# Patient Record
Sex: FEMALE | Race: WHITE | Employment: FULL TIME | URBAN - NONMETROPOLITAN AREA
[De-identification: names, ages, dates, MRNs, and addresses within clinical notes are randomized per-mention and may not be internally consistent; named-entity substitution may affect disease eponyms.]

---

## 2021-06-23 ENCOUNTER — HOSPITAL ENCOUNTER (OUTPATIENT)
Age: 51
Setting detail: OUTPATIENT SURGERY
Discharge: HOME | End: 2021-06-23
Attending: INTERNAL MEDICINE | Admitting: INTERNAL MEDICINE

## 2021-06-23 PROCEDURE — 3609015500 HC GASTRIC/DUODENAL MOTILITY &/OR MANOMETRY STUDY: Performed by: INTERNAL MEDICINE

## 2021-06-23 RX ORDER — ATORVASTATIN CALCIUM 40 MG/1
40 TABLET, FILM COATED ORAL
COMMUNITY
Start: 2020-12-23

## 2021-06-23 RX ORDER — FERROUS SULFATE 325(65) MG
325 TABLET ORAL 2 TIMES DAILY
COMMUNITY
Start: 2021-06-08 | End: 2021-08-07

## 2021-06-23 RX ORDER — MONTELUKAST SODIUM 10 MG/1
10 TABLET ORAL DAILY
COMMUNITY
Start: 2021-05-28

## 2021-06-23 RX ORDER — FLUOXETINE HYDROCHLORIDE 40 MG/1
40 CAPSULE ORAL DAILY
COMMUNITY
Start: 2020-11-12

## 2021-06-23 RX ORDER — HYDROCHLOROTHIAZIDE 25 MG/1
25 TABLET ORAL DAILY
COMMUNITY
Start: 2020-12-23

## 2021-06-23 RX ORDER — LIDOCAINE HYDROCHLORIDE 20 MG/ML
JELLY TOPICAL
Status: DISCONTINUED
Start: 2021-06-23 | End: 2021-06-23 | Stop reason: HOSPADM

## 2021-06-23 RX ORDER — CHOLECALCIFEROL (VITAMIN D3) 25 MCG
CAPSULE ORAL
COMMUNITY
Start: 2021-05-04

## 2021-06-23 RX ORDER — ATORVASTATIN CALCIUM 40 MG/1
TABLET, FILM COATED ORAL
COMMUNITY
Start: 2021-03-26

## 2021-06-23 RX ORDER — LOSARTAN POTASSIUM 100 MG/1
100 TABLET ORAL DAILY
COMMUNITY
Start: 2020-12-23

## 2021-06-23 NOTE — PROGRESS NOTES
Patient in endo for Eft study. Consent form signed. Manometry probe passed into left nare at 46 cm into stomach  Liquid swallows performed  Ph probe inserted into left nare  Lot number [id number]  Discharge instructions given to pt and she verbalized understanding.  Patient tolerated well and d/c home

## 2021-07-09 NOTE — PROCEDURES
[address] [place]  [address]                High Resolution Esophageal Manometry Study      Patient:  Marlen Wu    [id number] Gender: Female Physician: DR. George Ferreira / Age: 1970 : [employer]    Height: 5 ft 3 in Referring Physician: Catherine Gallegos    Procedure: Esophageal Manometry with Impedance Examination Date: 06/23/2021     Lower Esophageal Sphincter Region  Normal Esophageal Motility  Normal   Landmarks   Number of swallows evaluated 10        LES midpoint (from nares)(cm) 41.0  High Resolution Parameters         Proximal LES (from nares)(cm) 40.0      Distal contractile integral(mean)(mmHg-cm-s) 2614.6 500-5000       Distal LES (from nares)(cm) 44.0      Distal contractile integral(highest)(mmHg-cm-s) 3338.7        LES length(cm) 4.0 2.7-4.8     Contractile front velocity(cm/s) 2.3 <9.0       Esophageal length (LES-UES centers)(cm) 22.0  Carlton Classification         PIP (from nares)(cm) 41.5      Distal latency 8.5        Intraabdominal LES length(cm) 2.5      % failed (Carlton Classification) 10        Hiatal hernia? No      % panesophageal pressurization 10    LES Pressures       % premature contraction 0        Pressure vicki. method eSleeve,IRP      % rapid contraction 0        Basal (respiratory min. )(mmHg) 40.5 4.8-32.0     % large breaks 0        Basal (respiratory mean)(mmHg) 55.9 13-43     % small breaks 0        Residual (mean)(mmHg) 26.6 <15.0 Impedance analysis            Incomplete bolus clearance(%) 10            Upper Esophageal Sphincter  Normal Pharyngeal / UES Motility  Normal   Location (center, fr. nares)(cm) 19.0  No. swallows evaluated 10    Mean basal pressure(mmHg) 75.0  Evaluated @ 3.0 & N/A above UES     Mean residual pressure(mmHg) 5.5 <12.0     Mean peak pressure(mmHg) 12.5           Carlton Classification Findings*   EGJ: Outflow obstruction          Mean IRP (26.6 mmHg) is greater than 15 mmHg  Esophageal body: Some instances of intact or weak peristalsis  Finding: EGJ Outflow Obstruction  * Findings are based on published [place] Classification scheme and are only intended to serve as a guide for patient diagnosis     Procedure   After confirmation of potential allergies, a topical analgesic was used to numb the nares followed by trans-nasal insertion of a High Resolution Manometry Catheter. Pressure bands of both UES and LES were observed on the color contour. Patient instructed to take deep breath to verify placement of catheter; diaphragmatic pinch noted on inspiration. Patient was assisted to supine position and catheter was stabilized. Patient encouraged to relax while acclimating to catheter for approximately 5 minutes. A 30 second baseline pressure was obtained to identify the UES and LES followed by a series of wet swallows using 5 ccs room temperature water to assess esophageal motility. At the conclusion of the procedure; the catheter was removed. Indications   CLEAR THROAT  NASAL DRAINAGE  SORE THROAT     Interpretation / Findings   UES Pressure and relaxation:  normal  Peristalsis: normal  Esophageal pressures:  normal  GEJ relaxation:  incomplete  Incomplete bolus clearance: <10%     Impressions   Incomplete relaxation of GEJ with intact or weak peristalsis consistent with GEJ Outflow obstruction due to mechanical obstruction or atypical Achalasia. EGD recommended if not performed. Does not meet criteria for Achalasia at this time.       Anna Gentile MD, MD  4:29 PM 7/9/2021